# Patient Record
(demographics unavailable — no encounter records)

---

## 2019-02-19 NOTE — REP
Clinical:  Aspiration .

Technique:  PA and lateral.

 

Comparison:  None .

 

Findings:

The mediastinum and cardiothymic silhouette are normal.  The lung volumes are

symmetric and normal.  No acute consolidation, effusion, or pneumothorax.

Skeletal structures are intact and normal for age.

 

Impression:

No focal consolidation.

 

 

Electronically Signed by

Natan Garcia MD 02/19/2019 01:29 A